# Patient Record
Sex: FEMALE | Race: WHITE | Employment: OTHER | URBAN - METROPOLITAN AREA
[De-identification: names, ages, dates, MRNs, and addresses within clinical notes are randomized per-mention and may not be internally consistent; named-entity substitution may affect disease eponyms.]

---

## 2019-09-03 ENCOUNTER — THERAPY VISIT (OUTPATIENT)
Dept: PHYSICAL THERAPY | Facility: CLINIC | Age: 64
End: 2019-09-03
Payer: COMMERCIAL

## 2019-09-03 DIAGNOSIS — R29.898 BILATERAL LEG WEAKNESS: Primary | ICD-10-CM

## 2019-09-03 PROCEDURE — 97161 PT EVAL LOW COMPLEX 20 MIN: CPT | Mod: GP | Performed by: PHYSICAL THERAPIST

## 2019-09-03 PROCEDURE — 97112 NEUROMUSCULAR REEDUCATION: CPT | Mod: GP | Performed by: PHYSICAL THERAPIST

## 2019-09-03 PROCEDURE — 97110 THERAPEUTIC EXERCISES: CPT | Mod: GP | Performed by: PHYSICAL THERAPIST

## 2019-09-03 NOTE — LETTER
[facility] PHYSICAL THERAPY  [address]  [facility] [address]  [phone]    2019    Re: Gina Ocampo   :   1955  MRN:  [id number]   REFERRING PHYSICIAN:   Nicole Kimble    [facility] PHYSICAL [facility]    Date of Initial Evaluation:  9/3/2019  Visits:  Rxs Used: 1  Reason for Referral:  Bilateral leg weakness    EVALUATION SUMMARY    [facility] Initial Evaluation  Subjective:  The history is provided by the patient.   Gina Ocampo is a 64 year old female with deconditioning, weakness and poor balance condition which occurred with osteoporosis.  Problem details: Weakness started about 4 years ago, noted difficulty getting up toilet floor, car (pt reports this is horrible and very difficult), chair couches.  Pt reports no pain.  Difficulty going up and down stairs yoly in AM  Patient reports she does not have any pain in her lower extremities.  Patient reports she does leak a little urine if she laughs hard.  Patient reports that she is up 1 time a night to void.      Associated symptoms:  Loss of strength.                          Barriers to activity include: DXA scores show Osteoporosis.    Patient denies any falls or injuries to cause her lower extremity weakness.  She does report since she retired in May she has gained over 20 pounds    Objective:  Standing Alignment:    Cervical/Thoracic: Forward head  Shoulder/UE: Rounded shoulders  Lumbar: Lordosis incr    Patient has full functional range of motion of lower extremities.    30-second sit/stand test: 10 repetitions which is below the norm for her age which can indicate a fall risk      General Evaluation:  AROM:    Lumbopelvic:    Significant findings: Lumbar spine is stiff with loss of motion for sidebending and back bending  Re: Gina Ocampo   :   1955    Balance:    Single Leg Stance--Eyes Open:     Left: 6/30 sec      Right: 6/30 sec    Single-leg heel raises x10  Standing bilateral dorsiflexion was difficult and uncoordinated     Abdominals tested 70% both upper and lower according to Neo's work  Hip strength generally was 4/5 for extension and abduction     KNEE:  Patient has crepitus with range of motion and with exam she does have patella malalignment with externally rotated patella right greater than left with lateral pole.  She does does have hard thickened edema in her infrapatellar fat pads and bursa sac region.  Starting to test knees patient started getting muscle cramping and we were unable to continue exam advised increase fluids and trace minerals for her muscles.    Review of Systems   Psychiatric/Behavioral: Positive for depression.   All other systems reviewed and are negative.    Assessment/Plan:    Patient is a 64 year old female with bilateral lower extremity and core weakness complaints.    Patient has the following significant findings with corresponding treatment plan.                Diagnosis 1: Bilateral lower extremity and core weakness   Decreased strength - therapeutic exercise, therapeutic activities and home program  Impaired balance - neuro re-education, therapeutic activities and home program  Decreased function - therapeutic activities and home program    Therapy Evaluation Codes:   1) History comprised of:   Personal factors that impact the plan of care:      Time since onset of symptoms.    Comorbidity factors that impact the plan of care are:      Depression, Overweight and Osteoporosis.     Medications impacting care: Unknown.  2) Examination of Body Systems comprised of:   Body structures and functions that impact the plan of care:      Bilateral lower extremities and core.   Activity limitations that impact the plan of care are:      Squatting/kneeling, Stairs and Transfers.  3) Clinical presentation characteristics  are:   Evolving/Changing.  4) Decision-Making    Low complexity using standardized patient assessment instrument and/or   measureable assessment of functional outcome.  Cumulative Therapy Evaluation is: Low complexity.    Re: Gina Ocampo   :   1955    Previous and current functional limitations: (See Goal Flow Sheet for this information)    Short term and Long term goals: (See Goal Flow Sheet for this information)     Communication ability: Patient appears to be able to clearly communicate and understand verbal and written communication and follow directions correctly.    Treatment Explanation - The following has been discussed with the patient:     RX ordered/plan of care  Anticipated outcomes  Possible risks and side effects  This patient would benefit from PT intervention to resume normal activities.   Rehab potential is good.    Frequency: 1 X week, once daily  Duration: for 8 visits  Discharge Plan: Achieve all LTG.  Independent in home treatment program.  Return to previous functional level by discharge.  Reach maximal therapeutic benefit.        Thank you for your referral.      INQUIRIES  Therapist: Clemencia Nur, PT   [facility] PHYSICAL THERAP[facility]  [address] [address]  Phone: [phone]  Fax: [phone]

## 2019-09-03 NOTE — PROGRESS NOTES
[facility] Initial Evaluation  Subjective:  The history is provided by the patient.   Gina Ocampo is a 64 year old female with deconditioning, weakness and poor balance condition which occurred with osteoporosis.       Problem details: Weakness started about 4 years ago noted difficulty getting up toilet floor, car,( pt reports this is horrible and very difficult), chair couches.  Pr reports no pain  Difficulty going up and down stairs yoly in AM  Patient reports she does not have any pain in her lower extremities  Patient reports she does leak a little urine if she laughs hard  Patient reports that she is up 1 time a night to void.               Associated symptoms:  Loss of strength.                          Barriers to activity include.   DXA scores show Osteoporosis.                        Patient denies any falls or injuries to cause her lower extremity weakness.  She does report since she retired in May she has gained over 20 pounds    Objective:  Standing Alignment:    Cervical/Thoracic:  Forward head  Shoulder/UE:  Rounded shoulders  Lumbar:  Lordosis incr                      Patient has full functional range of motion of lower extremities.    30-second sit/stand test: 10 repetitions which is below the norm for her age which can indicate a fall risk      General Evaluation:  AROM:        Lumbopelvic:  Significant findings: Lumbar spine is stiff with loss of motion for sidebending and back bending                        Balance:    Single Leg Stance--Eyes Open:  Left: 6/30 sec    Right: 6/30 sec                                                   Single-leg heel raises x10  Standing bilateral dorsiflexion was difficult and uncoordinated     Abdominals tested 70% both upper and lower according to Neo's work  Hip strength generally was 4/5 for extension and abduction   KNEE:  Patient has crepitus with range of motion and with exam she does have patella malalignment with externally  rotated patella right greater than left with lateral pole  She does does have hard thickened edema in her infrapatellar fat pads and bursa sac region  starting to test knees patient started getting muscle cramping and we were unable to continue exam advised increase fluids and trace minerals for her muscles  Review of Systems   Psychiatric/Behavioral: Positive for depression.   All other systems reviewed and are negative.      Assessment/Plan:    Patient is a 64 year old female with bilateral lower extremity and core weakness complaints.    Patient has the following significant findings with corresponding treatment plan.                Diagnosis 1: Bilateral lower extremity and core weakness Decreased strength - therapeutic exercise, therapeutic activities and home program  Impaired balance - neuro re-education, therapeutic activities and home program  Decreased function - therapeutic activities and home program    Therapy Evaluation Codes:   1) History comprised of:   Personal factors that impact the plan of care:      Time since onset of symptoms.    Comorbidity factors that impact the plan of care are:      Depression, Overweight and Osteoporosis.     Medications impacting care: Unknown.  2) Examination of Body Systems comprised of:   Body structures and functions that impact the plan of care:      Bilateral lower extremities and core.   Activity limitations that impact the plan of care are:      Squatting/kneeling, Stairs and Transfers.  3) Clinical presentation characteristics are:   Evolving/Changing.  4) Decision-Making    Low complexity using standardized patient assessment instrument and/or measureable assessment of functional outcome.  Cumulative Therapy Evaluation is: Low complexity.    Previous and current functional limitations:  (See Goal Flow Sheet for this information)    Short term and Long term goals: (See Goal Flow Sheet for this information)     Communication ability:  Patient appears to be able  to clearly communicate and understand verbal and written communication and follow directions correctly.  Treatment Explanation - The following has been discussed with the patient:   RX ordered/plan of care  Anticipated outcomes  Possible risks and side effects  This patient would benefit from PT intervention to resume normal activities.   Rehab potential is good.    Frequency:  1 X week, once daily  Duration:  for 8 visits  Discharge Plan:  Achieve all LTG.  Independent in home treatment program.  Return to previous functional level by discharge.  Reach maximal therapeutic benefit.    Please refer to the daily flowsheet for treatment today, total treatment time and time spent performing 1:1 timed codes.

## 2019-09-04 ASSESSMENT — ENCOUNTER SYMPTOMS: DEPRESSION: 1

## 2019-09-10 ENCOUNTER — THERAPY VISIT (OUTPATIENT)
Dept: PHYSICAL THERAPY | Facility: CLINIC | Age: 64
End: 2019-09-10
Payer: COMMERCIAL

## 2019-09-10 DIAGNOSIS — R29.898 BILATERAL LEG WEAKNESS: Primary | ICD-10-CM

## 2019-09-10 PROCEDURE — 97110 THERAPEUTIC EXERCISES: CPT | Mod: GP | Performed by: PHYSICAL THERAPIST

## 2019-09-10 PROCEDURE — 97112 NEUROMUSCULAR REEDUCATION: CPT | Mod: GP | Performed by: PHYSICAL THERAPIST

## 2019-09-17 ENCOUNTER — THERAPY VISIT (OUTPATIENT)
Dept: PHYSICAL THERAPY | Facility: CLINIC | Age: 64
End: 2019-09-17
Payer: COMMERCIAL

## 2019-09-17 DIAGNOSIS — R29.898 BILATERAL LEG WEAKNESS: Primary | ICD-10-CM

## 2019-09-17 PROCEDURE — 97112 NEUROMUSCULAR REEDUCATION: CPT | Mod: GP | Performed by: PHYSICAL THERAPIST

## 2019-09-17 PROCEDURE — 97110 THERAPEUTIC EXERCISES: CPT | Mod: GP | Performed by: PHYSICAL THERAPIST

## 2019-09-24 ENCOUNTER — THERAPY VISIT (OUTPATIENT)
Dept: PHYSICAL THERAPY | Facility: CLINIC | Age: 64
End: 2019-09-24
Payer: COMMERCIAL

## 2019-09-24 DIAGNOSIS — R29.898 BILATERAL LEG WEAKNESS: Primary | ICD-10-CM

## 2019-09-24 PROCEDURE — 97112 NEUROMUSCULAR REEDUCATION: CPT | Mod: GP | Performed by: PHYSICAL THERAPIST

## 2019-09-24 PROCEDURE — 97110 THERAPEUTIC EXERCISES: CPT | Mod: GP | Performed by: PHYSICAL THERAPIST

## 2019-10-17 ENCOUNTER — THERAPY VISIT (OUTPATIENT)
Dept: PHYSICAL THERAPY | Facility: CLINIC | Age: 64
End: 2019-10-17
Payer: COMMERCIAL

## 2019-10-17 DIAGNOSIS — R29.898 BILATERAL LEG WEAKNESS: Primary | ICD-10-CM

## 2019-10-17 PROCEDURE — 97112 NEUROMUSCULAR REEDUCATION: CPT | Mod: GP | Performed by: PHYSICAL THERAPIST

## 2019-10-17 PROCEDURE — 97110 THERAPEUTIC EXERCISES: CPT | Mod: GP | Performed by: PHYSICAL THERAPIST

## 2020-03-02 ENCOUNTER — TRANSFERRED RECORDS (OUTPATIENT)
Dept: PHYSICAL THERAPY | Facility: CLINIC | Age: 65
End: 2020-03-02

## 2020-03-11 ENCOUNTER — HEALTH MAINTENANCE LETTER (OUTPATIENT)
Age: 65
End: 2020-03-11

## 2020-04-13 ENCOUNTER — VIRTUAL VISIT (OUTPATIENT)
Dept: PHYSICAL THERAPY | Facility: CLINIC | Age: 65
End: 2020-04-13
Payer: COMMERCIAL

## 2020-04-13 DIAGNOSIS — M54.10 RADICULAR LOW BACK PAIN: ICD-10-CM

## 2020-04-13 DIAGNOSIS — M51.369 BULGING LUMBAR DISC: ICD-10-CM

## 2020-04-13 PROCEDURE — 97110 THERAPEUTIC EXERCISES: CPT | Mod: 95 | Performed by: PHYSICAL THERAPIST

## 2020-04-13 PROCEDURE — 97112 NEUROMUSCULAR REEDUCATION: CPT | Mod: 95 | Performed by: PHYSICAL THERAPIST

## 2020-04-13 PROCEDURE — 97161 PT EVAL LOW COMPLEX 20 MIN: CPT | Mod: 95 | Performed by: PHYSICAL THERAPIST

## 2020-04-13 NOTE — PROGRESS NOTES
"Physical Therapy Virtual Initial Visit      The patient has been notified of following:     \"This virtual visit will be conducted between you and your provider. We have found that certain health care needs can be provided without the need for physical presence.  This service lets us provide the care you need with a virtual visit.\"    Due to external, as well as internal [facility] management of the COVID-19 Virus, Gina Ocampo was not seen in our clinic.  As a substitution, we implemented a virtual visit to manage this patient's condition utilizing the [employer] virtual visit platform via the patient s existing code.  The provider, Dona Jaquez, reviewed the patient's chart, PTRx prescription, and spoke with the patient to determine the following telemedicine visit is appropriate and effective for the patient's care.    The following type of visit was completed:   Video Visit:  The [employer] platform uses a synchronous HIPAA compliant video stream for this patient encounter.         Subjective:    Patient Health History  Gina Ocampo being seen for weakness in B legs started insidiously about 4.5 years ago.  Intermittent pain in L lateral hip/buttock and numbness in R leg..     Problem began: 3/9/2020 (MD visit).   Problem occurred: 12/31/19 patient was shoveling that morning and then that evening noticed numbness into R leg.  Not having much pain, more numbness entire R leg from waist, to lateral leg to entire foot.  1987 had one previous episode of LBP getting out of a truck and felt something pop.   Pain is reported as 8/10 (L lateral hip pain/buttock, R LB 1/10 at worst) on pain scale.  General health as reported by patient is good.  Pertinent medical history includes: depression, osteoporosis and history of fractures (fractured jaw).   Red flags:  Pain at rest/night.  Medical allergies: none.   Surgeries include:  Orthopedic surgery (maxilla fracture multiple reconstruction surgeries/bone grafts, 2 knee " scopes).    Current medications:  Anti-depressants (occasional Ibuprofen, vitamins).    Current occupation is retired.                     Therapist Generated HPI Evaluation         Type of problem:  Lumbar.    This is a chronic condition.      Patient reports pain:  Lumbar spine right (L lateral hip, like bursitis, occasional R LBP).  Pain is described as burning and sharp (L hip burning, R LB sharpness) and is constant (pain intermittent, tingling and numbness is constant).  Pain radiates to:  Gluteals left (numbness/tingling entire R leg from waist to entire foot). Pain is worse in the A.M. (and durning the night).  Since onset symptoms are gradually worsening.  Associated symptoms:  Numbness, loss of motion/stiffness, loss of strength, tingling and loss of balance (constant tingling and numbness). Symptoms are exacerbated by sitting and bending (losing about 2 hours of sleep per night due to pain and leg numbness, sit 10-15 min, yardwork bending over after 10 min, difficulty straightening up after sitting)  and relieved by ice, heat and NSAID's (walking, stretching, occasional ibuprofen).  Special tests included:  MRI (bulging disc impinging S1 nerve).  Previous treatment includes physical therapy (for B leg weakness 9/3/19-10/17/19).   Barriers include:  Stairs (difficulty with stairs early in the morning).                  Objective:    System       Lumbar/SI Evaluation  ROM:    AROM Lumbar:   Flexion:          Min loss, reaches 5 inches from floor, produces R LBP, NE on numbness  Ext:                    Min loss NE on numbness   Side Bend:        Left:     Right:   Rotation:           Left:     Right:   Side Glide:        Left:  No loss    Right:  No loss          Lumbar Myotomes:  Lumbar myotomes: heel and toe walking normal and equal B.                Lumbar Dermtomes:  Lumbar dermatomes: will have patient assess next.                Neural Tension/Mobility:      Left side:Slump  negative.   Right side:    Slump positive.  Lumbar Palpation:  Palpation (lumbar): tenderness R LB.                                                         Robyn Lumbar Evaluation    Posture:  Sitting: fair  Standing: fair  Lordosis: WNL  Lateral Shift: no  Correction of Posture: no effect  Other Observations: slouch/over-correct feels good but NE on tingling/numbness in R leg    Test Movements:  FIS: During: produces  After: no worse  Mechanical Response: no effectPretest Movements: 0/10 pain, constant tingling/numbness in R leg to foot  Repeat FIS: During: produces  After: no worse  Mechanical Response: no effect  EIS: During: no effect  After: no effect  Mechanical Response: no effect  Repeat EIS: During: no effect  After: no effect  Mechanical Response: no effect    EIL: During: no effect  After: no effect  Mechanical Response: no effect  Repeat EIL: During: no effect  After: no effect  Mechanical Response: IncROM      Static Tests:          Lying Prone in Extension: mild soreness during R LB, NW after    Conclusion: derangement (potential lumbar derangement, asymmetrical below knee)  Principle of Treatment:  Posture Correction: Educate keep neutral spine, use of lumbar support, use of slouch/over-correct, over-correct and back off 10% to neutral unsupported sitting.  Educate avoid repetitive and prolonged bending so modify those activities.  Discuss proper body mechanics to maintain neutral spine and practiced golfer's lift.    Extension: EIS and EIL x 10 every 2-3 hours or prn for relief    Other: Educate centralization/perpiheralization phenomenon, keep as active as able, discuss how good progression is numbness turning to tingling then would go away.  Discuss pain usually changes before tingling/numbness and listen to the body with all activities.                                     ROS    PTRx Content from today's visit:  Exercise Name: Virtual Visit Tips for Patients  Exercise Name: Neutral Spine Slouch Overcorrect, Notes: Be  aware of posture throughout day, try to avoid slumping and use the lumbar support everywhere that you sit. Ensure you sit with hips all the way back in chair when just sitting.  For the slouch/over-correct sit closer to edge of chair and rock back and forth 10-15x to break up prolonged sitting.  If you do not have a back support/lumbar roll find neutral position by over-correcting (pressing belly forward) and backing off 10% to neutral.  This is hard to maintain, but as you get stronger it will get easier.    It can take up to 3 weeks of working on posture to make it a new normal for you.  Exercise Name: Prone Press Ups, Notes: 10-15 reps every 2-3 hours or as needed for relief    Hold each rep for 1-2 seconds  Perform:  Any time as needed for relief  Right away in the morning  Before going to bed at night    If you have pain it is the best time to perform this exercise to see if you can change or lessen it.  Exercise Name: Standing Extension, Notes: Perform 10-15x every 2-3 hours or as needed for relief  Exercise Name: Body Mechanics - Golfers Lift, Notes: This is NOT an exercise to repeat.  Use throughout the day to lift light objects from floor or even across a counter, or for reaching into a grocery cart, , trunk of car, reaching across a bed to grab a pillow etc.  May use one hand on counter for support.    Assessment/Plan:     Patient is a 65 year old female with lumbar and R leg complaints.    Patient has the following significant findings with corresponding treatment plan.                Diagnosis 1:  R radicular LBP, spondylosis, bulging disc impinging S1 nerve    Pain -  manual therapy, self management, education, directional preference exercise and home program  Decreased ROM/flexibility - manual therapy, therapeutic exercise and home program  Decreased strength - therapeutic exercise, therapeutic activities and home program  Decreased proprioception - neuro re-education, therapeutic activities  and home program  Decreased function - therapeutic activities and home program  Impaired posture - neuro re-education and home program    Therapy Evaluation Codes:     Cumulative Therapy Evaluation is: Low complexity.    Previous and current functional limitations:  (See Goal Flow Sheet for this information)    Short term and Long term goals: (See Goal Flow Sheet for this information)     Communication ability:  Patient appears to be able to clearly communicate and understand verbal and written communication and follow directions correctly.  Treatment Explanation - The following has been discussed with the patient:   RX ordered/plan of care  Anticipated outcomes  Possible risks and side effects  This patient would benefit from PT intervention to resume normal activities.   Rehab potential is good.    Frequency:  1 X week, once daily  Duration:  for up to 12 weeks per MD  Discharge Plan:  Achieve all LTG.  Independent in home treatment program.  Reach maximal therapeutic benefit.    Please refer to the daily flowsheet for treatment today, total treatment time and time spent performing 1:1 timed codes.       Virtual visit contact time    Time of service began: 8:55 AM took 19 min to set up [employer] joel on patients android phone  Time of service ended: 10:06 AM  Total Time for set up, visit, documentation and schedule next appt: 85 minutes    Payor: [facility] / Plan: [facility] MEDICARE / Product Type: HMO /     Procedure Code/s   Therapeutic Exercise (04575): 15 minutes  Neuromuscular Re-education (25250): 10 minutes  Evaluation: 21 minutes    I have reviewed the note as documented above.  This accurately captures the substance of my conversation with the patient.  Provider location: [place], [place] ([facility])  Patient location: at [place], [place]    ___________________________________________________

## 2020-04-13 NOTE — LETTER
"[facility] PHYSICAL THERAPY  [address]  [facility] [place] [address]  [phone]    2020    Re: Gina Ocampo   :   1955  MRN:  [id number]   REFERRING PHYSICIAN:   Nicole Kimble    [facility] PHYSICAL [facility]ERAPY  Date of Initial Evaluation:    Visits:  Rxs Used: 1  Reason for Referral:     Radicular low back pain  Bulging lumbar disc    EVALUATION SUMMARY    Physical Therapy Virtual Initial Visit    The patient has been notified of following:     \"This virtual visit will be conducted between you and your provider. We have found that certain health care needs can be provided without the need for physical presence.  This service lets us provide the care you need with a virtual visit.\"    Due to external, as well as internal [facility] management of the COVID-19 Virus, Gina Ocampo was not seen in our clinic.  As a substitution, we implemented a virtual visit to manage this patient's condition utilizing the [employer] virtual visit platform via the patient s existing code.  The provider, Dona Jaquez, reviewed the patient's chart, PTRx prescription, and spoke with the patient to determine the following telemedicine visit is appropriate and effective for the patient's care.    The following type of visit was completed:   Video Visit:  The [employer] platform uses a synchronous HIPAA compliant video stream for this patient encounter.       Subjective:    Patient Health History  Gina Ocampo being seen for weakness in B legs started insidiously about 4.5 years ago.  Intermittent pain in L lateral hip/buttock and numbness in R leg..   Problem began: 3/9/2020 (MD visit).   Problem occurred: 18 patient was shoveling that morning and then that evening noticed numbness into R leg.  Not having much pain, more numbness entire R leg from waist, to lateral leg to entire foot.  1987 had one previous " episode of LBP getting out of a truck and felt something pop.   Pain is reported as 8/10 (L lateral hip pain/buttock, R LB 1/10 at worst) on pain scale.  General health as reported by patient is good.    Re: Gina Ocampo   :   1955- Page 2    Pertinent medical history includes: depression, osteoporosis and history of fractures (fractured jaw).   Red flags:  Pain at rest/night.  Medical allergies: none.   Surgeries include:  Orthopedic surgery (maxilla fracture multiple reconstruction surgeries/bone grafts, 2 knee scopes).    Current medications:  Anti-depressants (occasional Ibuprofen, vitamins).    Current occupation is retired.      Therapist Generated HPI Evaluation  Type of problem:  Lumbar.  This is a chronic condition.  Patient reports pain:  Lumbar spine right (L lateral hip, like bursitis, occasional R LBP).  Pain is described as burning and sharp (L hip burning, R LB sharpness) and is constant (pain intermittent, tingling and numbness is constant).  Pain radiates to:  Gluteals left (numbness/tingling entire R leg from waist to entire foot). Pain is worse in the A.M. (and durning the night).  Since onset symptoms are gradually worsening.  Associated symptoms:  Numbness, loss of motion/stiffness, loss of strength, tingling and loss of balance (constant tingling and numbness). Symptoms are exacerbated by sitting and bending (losing about 2 hours of sleep per night due to pain and leg numbness, sit 10-15 min, yardwork bending over after 10 min, difficulty straightening up after sitting)  and relieved by ice, heat and NSAID's (walking, stretching, occasional ibuprofen).  Special tests included:  MRI (bulging disc impinging S1 nerve).  Previous treatment includes physical therapy (for B leg weakness 9/3/19-10/17/19).   Barriers include:  Stairs (difficulty with stairs early in the morning).    Objective:       Lumbar/SI Evaluation  ROM:    AROM Lumbar:   Flexion:          Min loss, reaches 5 inches from  floor, produces R LBP, NE on numbness  Ext:                    Min loss NE on numbness   Side Bend:        Left:     Right:   Rotation:           Left:     Right:   Side Glide:        Left:  No loss    Right:  No loss  Lumbar Myotomes:  Lumbar myotomes: heel and toe walking normal and equal B.  Lumbar Dermtomes:  Lumbar dermatomes: will have patient assess next.  Neural Tension/Mobility:    Left side:Slump  negative.   Right side:   Slump positive.  Lumbar Palpation:  Palpation (lumbar): tenderness R LB.    Robyn Lumbar Evaluation  Posture:  Sitting: fair  Standing: fair  Lordosis: WNL  Lateral Shift: no  Correction of Posture: no effect  Other Observations: slouch/over-correct feels good but NE on tingling/numbness in R leg    Test Movements:  FIS: During: produces  After: no worse  Mechanical Response: no effectPretest Movements: 0/10 pain, constant tingling/numbness in R leg to foot  Repeat FIS: During: produces  After: no worse  Mechanical Response: no effect  EIS: During: no effect  After: no effect  Mechanical Response: no effect  Repeat EIS: During: no effect  After: no effect  Mechanical Response: no effect    EIL: During: no effect  After: no effect  Mechanical Response: no effect  Repeat EIL: During: no effect  After: no effect  Mechanical Response: IncROM    Static Tests:  Lying Prone in Extension: mild soreness during R LB, NW after    Conclusion: derangement (potential lumbar derangement, asymmetrical below knee)  Principle of Treatment:  Posture Correction: Educate keep neutral spine, use of lumbar support, use of slouch/over-correct, over-correct and back off 10% to neutral unsupported sitting.  Educate avoid repetitive and prolonged bending so modify those activities.  Discuss proper body mechanics to maintain neutral spine and practiced golfer's lift.    Extension: EIS and EIL x 10 every 2-3 hours or prn for relief    Other: Educate centralization/perpiheralization phenomenon, keep as active as  able, discuss how good progression is numbness turning to tingling then would go away.  Discuss pain usually changes before tingling/numbness and listen to the body with all activities.      PTRx Content from today's visit:  Exercise Name: Virtual Visit Tips for Patients  Exercise Name: Neutral Spine Slouch Overcorrect, Notes: Be aware of posture throughout day, try to avoid slumping and use the lumbar support everywhere that you sit. Ensure you sit with hips all the way back in chair when just sitting.  For the slouch/over-correct sit closer to edge of chair and rock back and forth 10-15x to break up prolonged sitting.  If you do not have a back support/lumbar roll find neutral position by over-correcting (pressing belly forward) and backing off 10% to neutral.  This is hard to maintain, but as you get stronger it will get easier.    It can take up to 3 weeks of working on posture to make it a new normal for you.  Exercise Name: Prone Press Ups, Notes: 10-15 reps every 2-3 hours or as needed for relief    Hold each rep for 1-2 seconds  Perform:  Any time as needed for relief  Right away in the morning  Before going to bed at night    If you have pain it is the best time to perform this exercise to see if you can change or lessen it.  Exercise Name: Standing Extension, Notes: Perform 10-15x every 2-3 hours or as needed for relief    Re: Gina COBOS Damaris   :   1955- page 3    Exercise Name: Body Mechanics - Golfers Lift, Notes: This is NOT an exercise to repeat.  Use throughout the day to lift light objects from floor or even across a counter, or for reaching into a grocery cart, , trunk of car, reaching across a bed to grab a pillow etc.  May use one hand on counter for support.    Assessment/Plan:     Patient is a 65 year old female with lumbar and R leg complaints.    Patient has the following significant findings with corresponding treatment plan.                Diagnosis 1:  R radicular LBP,  spondylosis, bulging disc impinging S1 nerve    Pain -  manual therapy, self management, education, directional preference exercise and home program  Decreased ROM/flexibility - manual therapy, therapeutic exercise and home program  Decreased strength - therapeutic exercise, therapeutic activities and home program  Decreased proprioception - neuro re-education, therapeutic activities and home program  Decreased function - therapeutic activities and home program  Impaired posture - neuro re-education and home program    Therapy Evaluation Codes:     Cumulative Therapy Evaluation is: Low complexity.    Previous and current functional limitations:  (See Goal Flow Sheet for this information)    Short term and Long term goals: (See Goal Flow Sheet for this information)     Communication ability:  Patient appears to be able to clearly communicate and understand verbal and written communication and follow directions correctly.  Treatment Explanation - The following has been discussed with the patient:   RX ordered/plan of care  Anticipated outcomes  Possible risks and side effects  This patient would benefit from PT intervention to resume normal activities.   Rehab potential is good.    Frequency:  1 X week, once daily  Duration:  for up to 12 weeks per MD  Discharge Plan:  Achieve all LTG.  Independent in home treatment program.  Reach maximal therapeutic benefit.    Virtual visit contact time    Time of service began: 8:55 AM took 19 min to set up ptrx joel on patients android phone  Time of service ended: 10:06 AM  Total Time for set up, visit, documentation and schedule next appt: 85 minutes    Payor: [facility] / Plan: [facility] MEDICARE / Product Type: HMO /     Procedure Code/s   Therapeutic Exercise (08360): 15 minutes  Re: Gina Ocampo   :   1955- page 4      Neuromuscular Re-education (74788): 10 minutes  Evaluation: 21 minutes    I have reviewed the note as documented above.  This accurately captures the  substance of my conversation with the patient.  Provider location: [place], [place] ([facility]State)  Patient location: at home, [place]  ___________________________________________________    Thank you for your referral.    INQUIRIES  Therapist: Dona Jaquez   [facility]  [address] [address]  Phone: [phone]  Fax: [phone]

## 2020-04-20 ENCOUNTER — VIRTUAL VISIT (OUTPATIENT)
Dept: PHYSICAL THERAPY | Facility: CLINIC | Age: 65
End: 2020-04-20
Payer: COMMERCIAL

## 2020-04-20 DIAGNOSIS — M54.10 RADICULAR LOW BACK PAIN: ICD-10-CM

## 2020-04-20 DIAGNOSIS — M51.369 BULGING LUMBAR DISC: ICD-10-CM

## 2020-04-20 PROCEDURE — 97112 NEUROMUSCULAR REEDUCATION: CPT | Mod: 95 | Performed by: PHYSICAL THERAPIST

## 2020-04-20 PROCEDURE — 97110 THERAPEUTIC EXERCISES: CPT | Mod: 95 | Performed by: PHYSICAL THERAPIST

## 2020-04-20 NOTE — PROGRESS NOTES
"Physical Therapy Virtual Follow Up Visit      The patient has been notified of following:     \"This virtual visit will be conducted between you and your provider. We have found that certain health care needs can be provided without the need for physical presence.  This service lets us provide the care you need with a virtual visit.\"    Due to external, as well as internal [facility] management of the COVID-19 Virus, Gina Ocampo was not seen in our clinic.  As a substitution, we implemented a virtual visit to manage this patient's condition utilizing the [employer] virtual visit platform via the patient s existing code.  The provider, Dona Jaquez, reviewed the patient's chart, PTRx prescription, and spoke with the patient to determine the following telemedicine visit is appropriate and effective for the patient's care.    The following type of visit was completed:   Video Visit:  The [employer] platform uses a synchronous HIPAA compliant video stream for this patient encounter.        S: Gina Ocampo is a 65 year old female. Connected virtually on the [employer] platform to discuss their condition/progress. They noted improvements in posture awareness and had no L lateral hip/buttock pain in the past week except yesterday after doing yardwork (flexed postures) so bursitis feels a lot better.  Has been sleeping through the night until last night.   Patient noting no change after performing EIS or slouch/over-correct exercises.  They noted ongoing pain or limitations with Numbness in R leg is still constant and spot on ball of foot is totally numb and feels like is standing on a rock.  R LBP still bothersome at low intensity.  For the first couple days after therapy did not do exercises due to pain R LB, but then decided to do them and has done EIL about 2-3x/day and EIS 4-5x/day.  Current pain level: 1/10    O: Patient demonstrated     Dermatomes all WNL except L5 and S1 hypo on the Right    LROM  flexion min loss, " increase R LBP, NW after  Extension min loss, increase, NW  Sideglide R no loss, L no loss with increase, R LBP, NW after    Standing baseline 1/10 R LBP  R EIS x 10 NE/NE  Prone lying baseline 0/10 pain  JORDAN 0/10 pain  R EIL 2x10 NE/NE no pain but upon standing 0/10 R LBP.  Cues to relax gluteals and LB throughout the exercise.    R EIL with LBS x 10 NE/NE no pain, improve ROM and less pain into L sideglide motion.    Educate, demonstrate and practice body mechanics (NMR) for daily activities such as yardwork with 1/2 kneel, kneeling for pulling weeds, ensuring neutral spine.  Also demonstrate modified golParStream's lift in kneeling.  Practiced 's bow and discuss how sit to stand is proper squat motion for lifting heavier items (neutral spine, knees behind toes, etc).    Field patient questions on anatomy and physiology of disc bulging and mechanically how extension exercises can assist with treatment and symptoms.  Review importance to listen to the body's response to activity and exercise, review centralization/perpiheralization phenomenon, keep as active as able and perform EIL before, during and after yardwork (or other provoking activities) if needed.  Discuss coming out of EIL exercise with neutral spine, not flexing onto all fours, and field patient question that for right now should not be performing child's pose type stretch due to how flexion affects symptoms. (but may progress to this in the future).  Also discuss how avoiding flexion postures/activities is also important to prevent compression fractures in spine due to her osteoporosis.      PTRx Content from today's visit:  Exercise Name: Virtual Visit Tips for Patients  Exercise Name: Neutral Spine Slouch Overcorrect, Notes: Be aware of posture throughout day, try to avoid slumping and use the lumbar support everywhere that you sit. Ensure you sit with hips all the way back in chair when just sitting.  For the slouch/over-correct sit closer to edge  of chair and rock back and forth 10-15x to break up prolonged sitting.  If you do not have a back support/lumbar roll find neutral position by over-correcting (pressing belly forward) and backing off 10% to neutral.  This is hard to maintain, but as you get stronger it will get easier.    It can take up to 3 weeks of working on posture to make it a new normal for you.  Exercise Name: Prone Press Ups, Notes: 10-15 reps every 2-3 hours or as needed for relief    Hold each rep for 1-2 seconds  Perform:  Any time as needed for relief  Right away in the morning  Before going to bed at night    If you have pain it is the best time to perform this exercise to see if you can change or lessen it.  Exercise Name: Repeated Extension in Lying with Lock/Sag, Notes: If no change with press-up, perform this exercise.  Focus on this lying down exercise more than the standing extension.  10-15 reps every 2-3 hours or as needed for relief    Hold each rep for 1-2 seconds  OR  May sustain 5-10 seconds and perform 5 reps  Perform:  Any time as needed for relief  Right away in the morning. Before going to bed at night        If you have pain it is the best time to perform this exercise to see if you can change or lessen it.  Exercise Name: Prone On Elbows, Notes: Use this position as needed for relief or as a modification to sitting (ie for reaching, watching TV, use of computer).     Anyone in this position for an extended period of time may feel stiff, so move out of the position occasionally as needed.  Exercise Name: Standing Extension, Notes: Perform only if not able to do the press-up exercise.  Perform 10-15x every 2-3 hours or as needed for relief  Exercise Name: Body Mechanics - Golfers Lift, Notes: This is NOT an exercise to repeat.  Use throughout the day to lift light objects from floor or even across a counter, or for reaching into a grocery cart, , trunk of car, reaching across a bed to grab a pillow etc.  May  use one hand on counter for support.    A:   Patient is progressing as expected.  Response to therapy has shown an improvement in pain level and posture  Response to therapy has shown lack of progress in numbness  The patient responds well to lumbar extension principles, most notably EIL and EIL with LBS.        P: Patient will continue with the exercise program assigned on their PTRx code and will add the following measures to manage their pain/condition: EIL with LBS and further focus on posture and body mechanics (avoiding flexion activities).  Will continue therapy with virtual visit in 2 weeks.     Current treatment program is being advanced to more complex exercises.      Virtual visit contact time    Time of service began: 9:00 AM  Time of service ended: 9:49 AM  Total Time for set up, visit, and documentation: 60 minutes    Payor: [facility] / Plan: [employer] MEDICARE / Product Type: HMO /   .  Procedure Code/s   Therapeutic Exercise (42117): 25 minutes  Neuromuscular Re-education (49751): 20 minutes    I have reviewed the note as documented above.  This accurately captures the substance of my conversation with the patient.  Provider location: [place], [place] ([facility])  Patient location: at home, [place]

## 2020-05-04 ENCOUNTER — VIRTUAL VISIT (OUTPATIENT)
Dept: PHYSICAL THERAPY | Facility: CLINIC | Age: 65
End: 2020-05-04
Payer: COMMERCIAL

## 2020-05-04 DIAGNOSIS — M51.369 BULGING LUMBAR DISC: ICD-10-CM

## 2020-05-04 DIAGNOSIS — M54.10 RADICULAR LOW BACK PAIN: ICD-10-CM

## 2020-05-04 PROCEDURE — 97112 NEUROMUSCULAR REEDUCATION: CPT | Mod: 95 | Performed by: PHYSICAL THERAPIST

## 2020-05-04 PROCEDURE — 97110 THERAPEUTIC EXERCISES: CPT | Mod: 95 | Performed by: PHYSICAL THERAPIST

## 2020-05-04 NOTE — PROGRESS NOTES
"Physical Therapy Virtual Follow Up Visit      The patient has been notified of following:     \"This virtual visit will be conducted between you and your provider. We have found that certain health care needs can be provided without the need for physical presence.  This service lets us provide the care you need with a virtual visit.\"    Due to external, as well as internal [facility] management of the COVID-19 Virus, Gina Ocampo was not seen in our clinic.  As a substitution, we implemented a virtual visit to manage this patient's condition utilizing the [employer] virtual visit platform via the patient s existing code.  The provider, Dona Jaquez, reviewed the patient's chart, PTRx prescription, and spoke with the patient to determine the following telemedicine visit is appropriate and effective for the patient's care.    The following type of visit was completed:   Video Visit:  The [employer] platform uses a synchronous HIPAA compliant video stream for this patient encounter.        S: Gina Ocampo is a 65 year old female. Connected virtually on the [employer] platform to discuss their condition/progress. They noted improvements in the day after last treatment half of the numbness went away in the right leg except lateral side of right foot.  Numbness in the 1/2 of R foot is still constant, but much smaller area.  Is much more aware of posture, is using lumbar support and usually sitting in card table chair that has good support and feels good to sit in and able without pain.  Has done EIL with LBS at least 4x/day and feels beneficial.  They noted ongoing pain or limitations with pain in L lateral hip and buttock occurred 2 nights in past 2 weeks where still lost 1-2 hours of sleep those nights due to pain (unknown what provoked).   EIL didn't seem to change the pain during the night while in bed.  Continues to feel weak with poor balance and would like to address this.  Still challenged with body mechanics as golfer's " lift is hard so just bends down poorly to  things, most notable pain with bending to  sticks and leaves with yardwork.  Current pain level: 0/10      O: Patient demonstrated     0/10 baseline pain  LROM improvement as no pain with LROM:  flexion min loss (reaches about 7 inches from floor), NE, NE  Extension min loss NE/NE   Sideglides no loss B, NE/NE no pain    SLS improved L 11 seconds    R 7-10 seconds although much more unsteady than L    Unsupported squat- excessive anterior knee excursion  Sit to stand from standard chair able to demonstrate proper technique, although last inch drops with uncontrolled descent    Challenged with golfers lift: cueing and practice x 8 with abdominal set, use R UE support when lift R leg.  Ensure trunk moves as much as lifted leg to keep spine neutral.  Discuss practice small ROM and eventually progress to full ROM as feels able.  Discuss use of rake etc in yard for support or lean into trunk of car for balance and then hinge at hip.    Field patient questions on bending and how to modify: educate/demonstrate 's bow, how to keep neutral spine with activities such as shaving legs,  items etc.     EIL with LBS 2x10 NE/NE feels good and after numbness in R lateral foot is less.  Discuss brushing teeth analogy for EIL for future prophylaxis.  For now continue with EIL at least 4x/day and prn for relief to continue to lessen pain and numbness, but 1-2x/day for lifetime prophylaxis.  Field patient questions on if can do EIL too much.  Discuss listen to the response of the body and perform as needed.  Cues to holding at end range 1-2 seconds with slow blow of air to relax into end range.      PTRx Content from today's visit:    Exercise Name: Neutral Spine Slouch Overcorrect, Notes: Be aware of posture throughout day, try to avoid slumping and use the lumbar support everywhere that you sit. Ensure you sit with hips all the way back in chair when just sitting.   For the slouch/over-correct sit closer to edge of chair and rock back and forth 10-15x to break up prolonged sitting.  If you do not have a back support/lumbar roll find neutral position by over-correcting (pressing belly forward) and backing off 10% to neutral.  This is hard to maintain, but as you get stronger it will get easier.    It can take up to 3 weeks of working on posture to make it a new normal for you.  Exercise Name: Prone Press Ups, Notes: 10-15 reps every 2-3 hours or as needed for relief, discuss usually with LBS, use if really stiff.    Exercise Name: Repeated Extension in Lying with Lock/Sag, Notes: If no change with press-up, perform this exercise.  Focus on this lying down exercise more than the standing extension.  10-15 reps every 2-3 hours or as needed for relief    Hold each rep for 1-2 seconds  OR  May sustain 5-10 seconds and perform 5 reps  Perform:  Any time as needed for relief  Right away in the morning. Before going to bed at night    If you have pain it is the best time to perform this exercise to see if you can change or lessen it.    Exercise Name: Prone On Elbows, Notes: Use this position as needed for relief or as a modification to sitting (ie for reaching, watching TV, use of computer).     Perform 10-15x every 2-3 hours or as needed for relief  Exercise Name: Body Mechanics - Golfers Lift, Notes: This is NOT an exercise to repeat.  Use throughout the day to lift light objects from floor or even across a counter, or for reaching into a grocery cart, , trunk of car, reaching across a bed to grab a pillow etc.  May use one hand on counter for support.    NEW Exercise Name: Bridging #1, Sets: 2-3 - Reps: 10, Notes: Perform every other day and progress to goal of 30.   If can get to 30 reps and not challenged then hold 5 seconds.    Tighten abdominal muscles throughout the entire exercises, also squeeze buttocks to lift.    NEW Exercise Name: Balance Single Leg Stance  Supported and Unsupported, Notes: Goal to hold for 60 seconds, practice throughout the day.  Keep the hips level, engage abdominal muscles, squeeze the buttock of the leg you are standing on and keep the legs apart.    NEW Exercise Name: Sit to Stand, Notes: Perform 3-4x/week with goal to 2-3 sets of 8-15 reps, or perform throughout the day with less repetitions.  Ensure the motion is controlled, rest hands on thighs or don't use hands.  Ensure when squatting that the knees do not migrate forward in front of the toes and keep abdominal muscles engaged throughout.    A:   Patient's symptoms are resolving.  Patient is progressing as expected.  Response to therapy has shown an improvement in  pain level, radicular symptoms, proprioception and posture  Response to therapy has shown lack of progress in  body mechanics  Able to progress to strengthening and patient feeling positive about therapy and response.    P: Patient will continue with the exercise program assigned on their PTRx code and will add the following measures to manage their pain/condition: Continue therapy 2x/month up to 4.5 months. (up to 12 visits per MD).  Patient scheduled another virtual appointment 5/18/20.     Current treatment program is being advanced to more complex exercises.      Virtual visit contact time    Time of service began: 9:01 AM  Time of service ended: 9:52 AM  Total Time for set up, visit, and documentation: 65 minutes    Payor: [facility] / Plan: [facility] MEDICARE / Product Type: HMO /   .  Procedure Code/s   Therapeutic Exercise (13972): 25 minutes  Neuromuscular Re-education (95891): 22 minutes    I have reviewed the note as documented above.  This accurately captures the substance of my conversation with the patient.  Provider location: [place], [place] ([facility])  Patient location: at [place], [place]

## 2020-07-02 PROBLEM — M51.369 BULGING LUMBAR DISC: Status: RESOLVED | Noted: 2020-04-13 | Resolved: 2020-07-02

## 2020-07-02 PROBLEM — M54.10 RADICULAR LOW BACK PAIN: Status: RESOLVED | Noted: 2020-04-13 | Resolved: 2020-07-02

## 2020-07-02 NOTE — PROGRESS NOTES
Please refer to the last note written on 5/4/20 for discharge information.  6/25/20 patient called clinic to report she is doing well and no need for physical therapy.

## 2021-01-03 ENCOUNTER — HEALTH MAINTENANCE LETTER (OUTPATIENT)
Age: 66
End: 2021-01-03

## 2021-04-25 ENCOUNTER — HEALTH MAINTENANCE LETTER (OUTPATIENT)
Age: 66
End: 2021-04-25

## 2021-08-30 ENCOUNTER — TRANSFERRED RECORDS (OUTPATIENT)
Dept: PHYSICAL THERAPY | Facility: CLINIC | Age: 66
End: 2021-08-30

## 2021-09-02 ENCOUNTER — THERAPY VISIT (OUTPATIENT)
Dept: PHYSICAL THERAPY | Facility: CLINIC | Age: 66
End: 2021-09-02
Payer: COMMERCIAL

## 2021-09-02 DIAGNOSIS — M25.551 BILATERAL HIP PAIN: ICD-10-CM

## 2021-09-02 DIAGNOSIS — M25.552 BILATERAL HIP PAIN: ICD-10-CM

## 2021-09-02 PROCEDURE — 97110 THERAPEUTIC EXERCISES: CPT | Mod: GP | Performed by: PHYSICAL THERAPIST

## 2021-09-02 PROCEDURE — 97140 MANUAL THERAPY 1/> REGIONS: CPT | Mod: GP | Performed by: PHYSICAL THERAPIST

## 2021-09-02 PROCEDURE — 97161 PT EVAL LOW COMPLEX 20 MIN: CPT | Mod: GP | Performed by: PHYSICAL THERAPIST

## 2021-09-02 ASSESSMENT — ACTIVITIES OF DAILY LIVING (ADL)
DEEP_SQUATTING: EXTREME DIFFICULTY
WALKING_UP_STEEP_HILLS: EXTREME DIFFICULTY
ROLLING_OVER_IN_BED: MODERATE DIFFICULTY
LIGHT_TO_MODERATE_WORK: EXTREME DIFFICULTY
STANDING_FOR_15_MINUTES: NO DIFFICULTY AT ALL
WALKING_INITIALLY: NO DIFFICULTY AT ALL
HOS_ADL_ITEM_SCORE_TOTAL: 37
PUTTING_ON_SOCKS_AND_SHOES: MODERATE DIFFICULTY
GOING_DOWN_1_FLIGHT_OF_STAIRS: MODERATE DIFFICULTY
RECREATIONAL_ACTIVITIES: EXTREME DIFFICULTY
TWISTING/PIVOTING_ON_INVOLVED_LEG: MODERATE DIFFICULTY
WALKING_DOWN_STEEP_HILLS: MODERATE DIFFICULTY
GETTING_INTO_AND_OUT_OF_AN_AVERAGE_CAR: MODERATE DIFFICULTY
GOING_UP_1_FLIGHT_OF_STAIRS: MODERATE DIFFICULTY
STEPPING_UP_AND_DOWN_CURBS: MODERATE DIFFICULTY
HEAVY_WORK: EXTREME DIFFICULTY
GETTING_INTO_AND_OUT_OF_A_BATHTUB: SLIGHT DIFFICULTY
SITTING_FOR_15_MINUTES: NO DIFFICULTY AT ALL
WALKING_APPROXIMATELY_10_MINUTES: NO DIFFICULTY AT ALL
HOS_ADL_SCORE(%): 54.41
HOW_WOULD_YOU_RATE_YOUR_CURRENT_LEVEL_OF_FUNCTION_DURING_YOUR_USUAL_ACTIVITIES_OF_DAILY_LIVING_FROM_0_TO_100_WITH_100_BEING_YOUR_LEVEL_OF_FUNCTION_PRIOR_TO_YOUR_HIP_PROBLEM_AND_0_BEING_THE_INABILITY_TO_PERFORM_ANY_OF_YOUR_USUAL_DAILY_ACTIVITIES?: 50
HOS_ADL_COUNT: 17
WALKING_15_MINUTES_OR_GREATER: SLIGHT DIFFICULTY
HOS_ADL_HIGHEST_POTENTIAL_SCORE: 68

## 2021-09-02 NOTE — PROGRESS NOTES
Physical Therapy Initial Evaluation  Subjective:    Therapist Generated HPI Evaluation  Problem details: MD order 8/30/21    Gina has bilateral hip pain past one year. She is in bed and constantly adjusting as she feels like its dislocating while rolling over and get in bed wrong. She has increased soreness in her left hip and right hip feels really weak. Soreness in hips after couple miles- someday's in few minutes. She mentioned to her provider- x ray was taken and was diagnosed with degenerative changes. .         Type of problem:  Bilateral hips.    This is a chronic condition.  Condition occurred with:  Repetition/overuse and degenerative joint disease.    Patient reports pain:  Lateral, posterior and greater trochanter.  Pain is described as aching and is intermittent.  Pain is worse during the night.  Since onset symptoms are unchanged.  Associated symptoms:  Loss of motion/stiffness, buckling/giving out and loss of strength. Symptoms are exacerbated by walking and lying on extremity  Relieved by: ibuprofen sometimes at night to go to sleep.  Special tests included:  X-ray.    Barriers include:  None as reported by patient.    Patient Health History  Gina Ocampo being seen for pola hip pain.     Date of Onset: one year    Problem occurred: unknown   Pain is reported as 4/10 on pain scale.  General health as reported by patient is good.  Pertinent medical history includes: depression, history of fractures, migraines/headaches, numbness/tingling, osteoporosis and smoking.   Red flags:  Significant weakness.  Medical allergies: none.   Surgeries include:  Orthopedic surgery. Other surgery history details: Arthroscopic knees.    Current medications:  Anti-depressants.    Current occupation is retired ex med.   Primary job tasks include:  Prolonged sitting.                                    Objective:    Gait:    Gait Type:  Antalgic   Weight Bearing Status:  WBAT     Deviations:  Hip:  Decr dynamic control L  and decr dynamic control R    Flexibility/Screens:       Lower Extremity:  Decreased left lower extremity flexibility:Hip ER's; Adductors; Piriformis; IT Band and Hamstrings    Decreased right lower extremity flexibility:  Hip ER's; Adductors; Piriformis; IT Band and Hamstrings                                                 Hip Evaluation  HIP AROM:  AROM:    Left Hip:     Normal    Right Hip:   Normal                    Hip Strength:    Flexion:   Left: 4/5   Pain:  Right: 4/5   Pain:                    Extension:  Left: 3+/5  Pain:Right: 3+/5    Pain:    Abduction:  Left: 3+/5     Pain:Right: 3+/5    Pain:  Adduction:  Left: 5/5    Pain:Right: 5/5   Pain:  Internal Rotation:  Left: 5/5    Pain:Right: 5/5   Pain:  External Rotation:  Left: 5/5   Pain:  Right: 5/5   Pain:              Hip Palpation:    Left hip tenderness present at:   Greater Trachanter; IT Band; Abductors and Gluteus Medius  Right hip tenderness present at:  Greater Trachanter; IT Band; Abductors and Gluteus Medius             General     ROS    Assessment/Plan:    Patient is a 66 year old female with both sides hip complaints.    Patient has the following significant findings with corresponding treatment plan.                Diagnosis 1:  Bilateral hip pain- IT band syndrome, weakness  Pain -  hot/cold therapy, US, manual therapy, STS, splint/taping/bracing/orthotics, self management, education and home program  Decreased ROM/flexibility - manual therapy, therapeutic exercise, therapeutic activity and home program  Decreased strength - therapeutic exercise, therapeutic activities and home program  Impaired gait - gait training and home program  Decreased function - therapeutic activities and home program    Therapy Evaluation Codes:   1) History comprised of:   Personal factors that impact the plan of care:      Past/current experiences and Time since onset of symptoms.    Comorbidity factors that impact the plan of care are:      check HPI.      Medications impacting care: check HPI.  2) Examination of Body Systems comprised of:   Body structures and functions that impact the plan of care:      Hip.   Activity limitations that impact the plan of care are:      Dressing, Lifting, Walking and Sleeping.  3) Clinical presentation characteristics are:   Stable/Uncomplicated.  4) Decision-Making    Low complexity using standardized patient assessment instrument and/or measureable assessment of functional outcome.  Cumulative Therapy Evaluation is: Low complexity.    Previous and current functional limitations:  (See Goal Flow Sheet for this information)    Short term and Long term goals: (See Goal Flow Sheet for this information)     Communication ability:  Patient appears to be able to clearly communicate and understand verbal and written communication and follow directions correctly.  Treatment Explanation - The following has been discussed with the patient:   RX ordered/plan of care  Anticipated outcomes  Possible risks and side effects  This patient would benefit from PT intervention to resume normal activities.   Rehab potential is good.    Frequency:  1 X week, once daily  Duration:  for 6 weeks  Discharge Plan:  Achieve all LTG.  Independent in home treatment program.  Reach maximal therapeutic benefit.    Please refer to the daily flowsheet for treatment today, total treatment time and time spent performing 1:1 timed codes.

## 2021-09-02 NOTE — LETTER
[facility]  [address]  [place] [address]  [phone]    September 3, 2021    Re: Gina Ocampo   :   1955  MRN:  [id number]   REFERRING PHYSICIAN:   Nicole HERRMANN [facility]  Date of Initial Evaluation:  21  Visits:  Rxs Used: 1  Reason for Referral:  Bilateral hip pain    EVALUATION SUMMARY    Physical Therapy Initial Evaluation  Subjective:    Therapist Generated HPI Evaluation  Problem details: MD order 21    Gina has bilateral hip pain past one year. She is in bed and constantly adjusting as she feels like its dislocating while rolling over and get in bed wrong. She has increased soreness in her left hip and right hip feels really weak. Soreness in hips after couple miles- someday's in few minutes. She mentioned to her provider- x ray was taken and was diagnosed with degenerative changes. .         Type of problem:  Bilateral hips.  This is a chronic condition.  Condition occurred with:  Repetition/overuse and degenerative joint disease.    Patient reports pain:  Lateral, posterior and greater trochanter.  Pain is described as aching and is intermittent.  Pain is worse during the night.  Since onset symptoms are unchanged.  Associated symptoms:  Loss of motion/stiffness, buckling/giving out and loss of strength. Symptoms are exacerbated by walking and lying on extremity  Relieved by: ibuprofen sometimes at night to go to sleep.  Special tests included:  X-ray.  Barriers include:  None as reported by patient.    Patient Health History  Gina Ocampo being seen for pola hip pain.   Date of Onset: one year    Problem occurred: unknown   Pain is reported as 4/10 on pain scale.  General health as reported by patient is good.  Pertinent medical history includes: depression, history of fractures, migraines/headaches, numbness/tingling, osteoporosis and smoking.   Red  flags:  Significant weakness.  Medical allergies: none.   Re: Gina Ocampo   :   1955    Surgeries include:  Orthopedic surgery. Other surgery history details: Arthroscopic knees.    Current medications:  Anti-depressants.    Current occupation is retired ex med.   Primary job tasks include:  Prolonged sitting.                  Objective:  Gait:    Gait Type:  Antalgic   Weight Bearing Status:  WBAT     Deviations:  Hip:  Decr dynamic control L and decr dynamic control R  Flexibility/Screens:   Lower Extremity:  Decreased left lower extremity flexibility:Hip ER's; Adductors; Piriformis; IT Band and Hamstrings  Decreased right lower extremity flexibility:  Hip ER's; Adductors; Piriformis; IT Band and Hamstrings       Hip Evaluation  HIP AROM:  AROM:    Left Hip:     Normal    Right Hip:   Normal  Hip Strength:    Flexion:   Left: 4/5   Right: 4/5         Extension:  Left: 3+/5  Right: 3+/5      Abduction:  Left: 3+/5     Right: 3+/5      Adduction:  Left: 5/5    Right: 5/5     Internal Rotation:  Left: 5/5    Right: 5/5     External Rotation:  Left: 5/5   Right: 5/5     Hip Palpation:    Left hip tenderness present at:   Greater Trachanter; IT Band; Abductors and Gluteus Medius  Right hip tenderness present at:  Greater Trachanter; IT Band; Abductors and Gluteus Medius       Assessment/Plan:    Patient is a 66 year old female with both sides hip complaints.    Patient has the following significant findings with corresponding treatment plan.                Diagnosis 1:  Bilateral hip pain- IT band syndrome, weakness  Pain -  hot/cold therapy, US, manual therapy, STS, splint/taping/bracing/orthotics, self management, education and home program  Decreased ROM/flexibility - manual therapy, therapeutic exercise, therapeutic activity and home program  Decreased strength - therapeutic exercise, therapeutic activities and home program  Impaired gait - gait training and home program  Decreased function - therapeutic  activities and home program            Re: Gina Ocampo   :   1955      Therapy Evaluation Codes:   1) History comprised of:   Personal factors that impact the plan of care:      Past/current experiences and Time since onset of symptoms.    Comorbidity factors that impact the plan of care are:      check HPI.     Medications impacting care: check HPI.  2) Examination of Body Systems comprised of:   Body structures and functions that impact the plan of care:      Hip.   Activity limitations that impact the plan of care are:      Dressing, Lifting, Walking and Sleeping.  3) Clinical presentation characteristics are:   Stable/Uncomplicated.  4) Decision-Making    Low complexity using standardized patient assessment instrument and/or measureable assessment of functional outcome.  Cumulative Therapy Evaluation is: Low complexity.    Previous and current functional limitations:  (See Goal Flow Sheet for this information)    Short term and Long term goals: (See Goal Flow Sheet for this information)     Communication ability:  Patient appears to be able to clearly communicate and understand verbal and written communication and follow directions correctly.  Treatment Explanation - The following has been discussed with the patient:     RX ordered/plan of care  Anticipated outcomes  Possible risks and side effects  This patient would benefit from PT intervention to resume normal activities.   Rehab potential is good.    Frequency:  1 X week, once daily  Duration:  for 6 weeks  Discharge Plan:  Achieve all LTG.  Independent in home treatment program.  Reach maximal therapeutic benefit.      Thank you for your referral.    INQUIRIES  Therapist: Blanca Bennett PT  [facility] [place]  [address] [place] [id number]  Phone: [phone]  Fax: [phone]

## 2021-09-15 ENCOUNTER — THERAPY VISIT (OUTPATIENT)
Dept: PHYSICAL THERAPY | Facility: CLINIC | Age: 66
End: 2021-09-15
Payer: COMMERCIAL

## 2021-09-15 DIAGNOSIS — M25.552 BILATERAL HIP PAIN: ICD-10-CM

## 2021-09-15 DIAGNOSIS — M25.551 BILATERAL HIP PAIN: ICD-10-CM

## 2021-09-15 PROCEDURE — 97110 THERAPEUTIC EXERCISES: CPT | Mod: GP | Performed by: PHYSICAL THERAPIST

## 2021-09-15 PROCEDURE — 97112 NEUROMUSCULAR REEDUCATION: CPT | Mod: GP | Performed by: PHYSICAL THERAPIST

## 2021-09-15 PROCEDURE — 97140 MANUAL THERAPY 1/> REGIONS: CPT | Mod: GP | Performed by: PHYSICAL THERAPIST

## 2021-09-22 ENCOUNTER — THERAPY VISIT (OUTPATIENT)
Dept: PHYSICAL THERAPY | Facility: CLINIC | Age: 66
End: 2021-09-22
Payer: COMMERCIAL

## 2021-09-22 DIAGNOSIS — M25.552 BILATERAL HIP PAIN: ICD-10-CM

## 2021-09-22 DIAGNOSIS — M25.551 BILATERAL HIP PAIN: ICD-10-CM

## 2021-09-22 PROCEDURE — 97112 NEUROMUSCULAR REEDUCATION: CPT | Mod: GP | Performed by: PHYSICAL THERAPIST

## 2021-09-22 PROCEDURE — 97110 THERAPEUTIC EXERCISES: CPT | Mod: GP | Performed by: PHYSICAL THERAPIST

## 2021-09-27 ENCOUNTER — THERAPY VISIT (OUTPATIENT)
Dept: PHYSICAL THERAPY | Facility: CLINIC | Age: 66
End: 2021-09-27
Payer: COMMERCIAL

## 2021-09-27 DIAGNOSIS — M25.551 BILATERAL HIP PAIN: ICD-10-CM

## 2021-09-27 DIAGNOSIS — M25.552 BILATERAL HIP PAIN: ICD-10-CM

## 2021-09-27 PROCEDURE — 97110 THERAPEUTIC EXERCISES: CPT | Mod: GP | Performed by: PHYSICAL THERAPIST

## 2021-09-27 PROCEDURE — 97530 THERAPEUTIC ACTIVITIES: CPT | Mod: GP | Performed by: PHYSICAL THERAPIST

## 2021-09-27 PROCEDURE — 97112 NEUROMUSCULAR REEDUCATION: CPT | Mod: GP | Performed by: PHYSICAL THERAPIST

## 2021-10-10 ENCOUNTER — HEALTH MAINTENANCE LETTER (OUTPATIENT)
Age: 66
End: 2021-10-10

## 2021-10-14 ENCOUNTER — THERAPY VISIT (OUTPATIENT)
Dept: PHYSICAL THERAPY | Facility: CLINIC | Age: 66
End: 2021-10-14
Payer: COMMERCIAL

## 2021-10-14 DIAGNOSIS — M25.551 BILATERAL HIP PAIN: ICD-10-CM

## 2021-10-14 DIAGNOSIS — M25.552 BILATERAL HIP PAIN: ICD-10-CM

## 2021-10-14 PROCEDURE — 97112 NEUROMUSCULAR REEDUCATION: CPT | Mod: GP | Performed by: PHYSICAL THERAPIST

## 2021-10-14 PROCEDURE — 97110 THERAPEUTIC EXERCISES: CPT | Mod: GP | Performed by: PHYSICAL THERAPIST

## 2021-11-04 ENCOUNTER — THERAPY VISIT (OUTPATIENT)
Dept: PHYSICAL THERAPY | Facility: CLINIC | Age: 66
End: 2021-11-04
Payer: COMMERCIAL

## 2021-11-04 DIAGNOSIS — M25.551 BILATERAL HIP PAIN: ICD-10-CM

## 2021-11-04 DIAGNOSIS — M25.552 BILATERAL HIP PAIN: ICD-10-CM

## 2021-11-04 PROCEDURE — 97112 NEUROMUSCULAR REEDUCATION: CPT | Mod: GP | Performed by: PHYSICAL THERAPIST

## 2021-11-04 PROCEDURE — 97110 THERAPEUTIC EXERCISES: CPT | Mod: GP | Performed by: PHYSICAL THERAPIST

## 2021-11-04 ASSESSMENT — ACTIVITIES OF DAILY LIVING (ADL)
HOW_WOULD_YOU_RATE_YOUR_CURRENT_LEVEL_OF_FUNCTION_DURING_YOUR_USUAL_ACTIVITIES_OF_DAILY_LIVING_FROM_0_TO_100_WITH_100_BEING_YOUR_LEVEL_OF_FUNCTION_PRIOR_TO_YOUR_HIP_PROBLEM_AND_0_BEING_THE_INABILITY_TO_PERFORM_ANY_OF_YOUR_USUAL_DAILY_ACTIVITIES?: 80
WALKING_INITIALLY: NO DIFFICULTY AT ALL
SITTING_FOR_15_MINUTES: NO DIFFICULTY AT ALL
GOING_DOWN_1_FLIGHT_OF_STAIRS: SLIGHT DIFFICULTY
STEPPING_UP_AND_DOWN_CURBS: NO DIFFICULTY AT ALL
PUTTING_ON_SOCKS_AND_SHOES: NO DIFFICULTY AT ALL
WALKING_15_MINUTES_OR_GREATER: NO DIFFICULTY AT ALL
WALKING_APPROXIMATELY_10_MINUTES: NO DIFFICULTY AT ALL
TWISTING/PIVOTING_ON_INVOLVED_LEG: NO DIFFICULTY AT ALL
RECREATIONAL_ACTIVITIES: NO DIFFICULTY AT ALL
GETTING_INTO_AND_OUT_OF_AN_AVERAGE_CAR: SLIGHT DIFFICULTY
DEEP_SQUATTING: MODERATE DIFFICULTY
ROLLING_OVER_IN_BED: SLIGHT DIFFICULTY
STANDING_FOR_15_MINUTES: NO DIFFICULTY AT ALL
HOS_ADL_ITEM_SCORE_TOTAL: 63
GOING_UP_1_FLIGHT_OF_STAIRS: NO DIFFICULTY AT ALL
GETTING_INTO_AND_OUT_OF_A_BATHTUB: NO DIFFICULTY AT ALL
LIGHT_TO_MODERATE_WORK: NO DIFFICULTY AT ALL
HEAVY_WORK: NO DIFFICULTY AT ALL
HOS_ADL_COUNT: 17
WALKING_DOWN_STEEP_HILLS: NO DIFFICULTY AT ALL
WALKING_UP_STEEP_HILLS: NO DIFFICULTY AT ALL
HOS_ADL_SCORE(%): 92.65
HOS_ADL_HIGHEST_POTENTIAL_SCORE: 68

## 2021-11-04 NOTE — PROGRESS NOTES
Subjective:  HPI  Physical Exam                    Objective:  System    Physical Exam    General     ROS    Assessment/Plan:    DISCHARGE REPORT    Progress reporting period is from 9/2/21 to 11/4/21.       SUBJECTIVE  Subjective changes noted by patient:  Subjective: Gina is doing well, feeling much better, kept up with her exercises. She wants to discharge with HEP    Current pain level is  Current Pain level: 0/10 (right, 1/10 left).     Previous pain level was    .   Changes in function:  Yes (See Goal flowsheet attached for changes in current functional level)  Adverse reaction to treatment or activity: None    OBJECTIVE  Changes noted in objective findings:  Yes,   Objective: MMT hip 5/5, Knee 5/5, hip AOM is WFL, Balance SLS without support 10 sec. Good hip flexbility demonstrated.      ASSESSMENT/PLAN  Updated problem list and treatment plan: Diagnosis 1:  Bilateral hip pain    STG/LTGs have been met or progress has been made towards goals:  Yes (See Goal flow sheet completed today.)  Assessment of Progress: The patient's condition is improving.  The patient's condition has potential to improve.  The patient has met all of their long term goals.  Self Management Plans:  Patient is independent in a home treatment program.  Patient is independent in self management of symptoms.  I have re-evaluated this patient and find that the nature, scope, duration and intensity of the therapy is appropriate for the medical condition of the patient.  Gina continues to require the following intervention to meet STG and LTG's:  PT intervention is no longer required to meet STG/LTG.    Recommendations:  This patient is ready to be discharged from therapy and continue their home treatment program.    Please refer to the daily flowsheet for treatment today, total treatment time and time spent performing 1:1 timed codes.

## 2021-12-05 ENCOUNTER — HEALTH MAINTENANCE LETTER (OUTPATIENT)
Age: 66
End: 2021-12-05

## 2022-05-22 ENCOUNTER — HEALTH MAINTENANCE LETTER (OUTPATIENT)
Age: 67
End: 2022-05-22

## 2022-09-18 ENCOUNTER — HEALTH MAINTENANCE LETTER (OUTPATIENT)
Age: 67
End: 2022-09-18

## 2023-06-04 ENCOUNTER — HEALTH MAINTENANCE LETTER (OUTPATIENT)
Age: 68
End: 2023-06-04

## 2023-12-17 ENCOUNTER — HEALTH MAINTENANCE LETTER (OUTPATIENT)
Age: 68
End: 2023-12-17

## 2024-07-14 ENCOUNTER — HEALTH MAINTENANCE LETTER (OUTPATIENT)
Age: 69
End: 2024-07-14